# Patient Record
Sex: FEMALE
[De-identification: names, ages, dates, MRNs, and addresses within clinical notes are randomized per-mention and may not be internally consistent; named-entity substitution may affect disease eponyms.]

---

## 2020-08-25 ENCOUNTER — APPOINTMENT (OUTPATIENT)
Dept: ENDOCRINOLOGY | Facility: CLINIC | Age: 30
End: 2020-08-25
Payer: COMMERCIAL

## 2020-08-25 ENCOUNTER — TRANSCRIPTION ENCOUNTER (OUTPATIENT)
Age: 30
End: 2020-08-25

## 2020-08-25 VITALS
WEIGHT: 148 LBS | OXYGEN SATURATION: 99 % | BODY MASS INDEX: 24.36 KG/M2 | DIASTOLIC BLOOD PRESSURE: 70 MMHG | HEIGHT: 65.5 IN | SYSTOLIC BLOOD PRESSURE: 108 MMHG | HEART RATE: 90 BPM

## 2020-08-25 DIAGNOSIS — Z86.39 PERSONAL HISTORY OF OTHER ENDOCRINE, NUTRITIONAL AND METABOLIC DISEASE: ICD-10-CM

## 2020-08-25 DIAGNOSIS — Z78.9 OTHER SPECIFIED HEALTH STATUS: ICD-10-CM

## 2020-08-25 DIAGNOSIS — Z83.3 FAMILY HISTORY OF DIABETES MELLITUS: ICD-10-CM

## 2020-08-25 DIAGNOSIS — Z83.49 FAMILY HISTORY OF OTHER ENDOCRINE, NUTRITIONAL AND METABOLIC DISEASES: ICD-10-CM

## 2020-08-25 PROBLEM — Z00.00 ENCOUNTER FOR PREVENTIVE HEALTH EXAMINATION: Status: ACTIVE | Noted: 2020-08-25

## 2020-08-25 LAB — GLUCOSE BLDC GLUCOMTR-MCNC: 288

## 2020-08-25 PROCEDURE — 99205 OFFICE O/P NEW HI 60 MIN: CPT | Mod: 25

## 2020-08-25 PROCEDURE — 82962 GLUCOSE BLOOD TEST: CPT

## 2020-08-25 NOTE — ASSESSMENT
[Diabetes Foot Care] : diabetes foot care [Long Term Vascular Complications] : long term vascular complications of diabetes [Carbohydrate Consistent Diet] : carbohydrate consistent diet [Importance of Diet and Exercise] : importance of diet and exercise to improve glycemic control, achieve weight loss and improve cardiovascular health [Exercise/Effect on Glucose] : exercise/effect on glucose [Self Monitoring of Blood Glucose] : self monitoring of blood glucose [Retinopathy Screening] : Patient was referred to ophthalmology for retinopathy screening [Diabetic Medications] : Risks and benefits of diabetic medications were discussed

## 2020-08-25 NOTE — HISTORY OF PRESENT ILLNESS
[FreeTextEntry1] : Ms. MARK MILLER is 28 year female .\par D  was diagnosed of diabetes type 2- 5    years back. She was diagnosed in pregnancy \par Till recently she was taking combination of Gliclazide and Metformin- 2 days back \par now only on metformin last two 2 days.  \par suffered miscarriage 5 yrs back \par no insulin use except for pregnancy \par last A1c was 7 % - a year back \par was on glycomet in [place] as well \par was also taking Dr Duong Hanna \par losing weight , , no heart attack , no stroke \par not fasting today - had high carb lunch \par she is tolerating these medications well. \par no recent eye exam \par neuropathic symptoms -none \par weight loss -yes \par  Hypoglycemia frequency is none \par family history of diabetes -father \par mother has thyroid concerns \par high carb diet - [place] dietary preferences \par Educator visit -never \par \par

## 2020-08-25 NOTE — REASON FOR VISIT
[DM Type 2] : DM Type 2 [Consultation] : a consultation visit [Spouse] : spouse [FreeTextEntry2] : PCP

## 2020-08-28 ENCOUNTER — LABORATORY RESULT (OUTPATIENT)
Age: 30
End: 2020-08-28

## 2020-08-31 ENCOUNTER — APPOINTMENT (OUTPATIENT)
Dept: ENDOCRINOLOGY | Facility: CLINIC | Age: 30
End: 2020-08-31
Payer: COMMERCIAL

## 2020-08-31 VITALS
DIASTOLIC BLOOD PRESSURE: 70 MMHG | BODY MASS INDEX: 24.2 KG/M2 | HEART RATE: 80 BPM | OXYGEN SATURATION: 98 % | HEIGHT: 65.5 IN | WEIGHT: 147 LBS | SYSTOLIC BLOOD PRESSURE: 100 MMHG

## 2020-08-31 LAB — GLUCOSE BLDC GLUCOMTR-MCNC: 175

## 2020-08-31 PROCEDURE — 82962 GLUCOSE BLOOD TEST: CPT

## 2020-08-31 PROCEDURE — 99214 OFFICE O/P EST MOD 30 MIN: CPT | Mod: 25

## 2020-08-31 NOTE — HISTORY OF PRESENT ILLNESS
[FreeTextEntry1] : Ms. MARK MILLER is 28 year female .\par D  was diagnosed of diabetes type 2- 5    years back. She was diagnosed in pregnancy \par Till recently she was taking combination of Gliclazide and Metformin- 2 days back \par now only on metformin last two 2 days.  \par suffered miscarriage 5 yrs back \par no insulin use except for pregnancy \par last A1c was 7 % - a year back \par was on glycomet in [place] as well \par was also taking Dr Duong Hanna \par losing weight , , no heart attack , no stroke \par not fasting today - had high carb lunch \par she is tolerating these medications well. \par no recent eye exam \par neuropathic symptoms -none \par weight loss -yes \par  Hypoglycemia frequency is none \par family history of diabetes -father \par mother has thyroid concerns \par high carb diet - [place] dietary preferences \par Educator visit -never \par \par \par 08/31/2020- FOLLOW UP\par labs discussed in detail \par lipid panel pending \par adv to follow low carb diet \par Review of system \par no chest pain, no palpitations \par no Shortness of breath,no wheezing. \par \par \par

## 2020-08-31 NOTE — REASON FOR VISIT
[Consultation] : a consultation visit [DM Type 2] : DM Type 2 [Spouse] : spouse [FreeTextEntry2] : PCP

## 2021-01-08 ENCOUNTER — TRANSCRIPTION ENCOUNTER (OUTPATIENT)
Age: 31
End: 2021-01-08

## 2021-03-05 ENCOUNTER — APPOINTMENT (OUTPATIENT)
Dept: ENDOCRINOLOGY | Facility: CLINIC | Age: 31
End: 2021-03-05
Payer: COMMERCIAL

## 2021-03-05 VITALS
SYSTOLIC BLOOD PRESSURE: 110 MMHG | BODY MASS INDEX: 23.7 KG/M2 | OXYGEN SATURATION: 99 % | DIASTOLIC BLOOD PRESSURE: 60 MMHG | HEIGHT: 65.5 IN | HEART RATE: 90 BPM | WEIGHT: 144 LBS

## 2021-03-05 LAB — GLUCOSE BLDC GLUCOMTR-MCNC: 152

## 2021-03-05 PROCEDURE — 99214 OFFICE O/P EST MOD 30 MIN: CPT | Mod: 25

## 2021-03-05 PROCEDURE — 99072 ADDL SUPL MATRL&STAF TM PHE: CPT

## 2021-03-05 PROCEDURE — 82962 GLUCOSE BLOOD TEST: CPT

## 2021-03-05 NOTE — HISTORY OF PRESENT ILLNESS
[FreeTextEntry1] : Ms. MARK MILLER is 28 year female .\par D  was diagnosed of diabetes type 2- 5    years back. She was diagnosed in pregnancy \par Till recently she was taking combination of Gliclazide and Metformin- 2 days back \par now only on metformin last two 2 days.  \par suffered miscarriage 5 yrs back \par no insulin use except for pregnancy \par last A1c was 7 % - a year back \par was on glycomet in [place] as well \par was also taking Dr Duong Hanna \par losing weight , , no heart attack , no stroke \par not fasting today - had high carb lunch \par she is tolerating these medications well. \par no recent eye exam \par neuropathic symptoms -none \par weight loss -yes \par  Hypoglycemia frequency is none \par family history of diabetes -father \par mother has thyroid concerns \par high carb diet - [place] dietary preferences \par Educator visit -never \par \par \par 08/31/2020- FOLLOW UP\par labs discussed in detail \par lipid panel pending \par adv to follow low carb diet \par Review of system \par no chest pain, no palpitations \par no Shortness of breath,no wheezing. \par \par \par 03/05/2021- FOLLOW UP\par 30-year-old lady returns for follow-up.  She is on Metformin thousand twice daily but has been taking less of doses as she is thinks this is a very high dose.  She is also taking Januvia 100 mg daily.  Of recent over last 2 3 months she has tried intermittent fasting which she follows a 12 on 12 of pattern or she would do a 14-hour fasting versus 6-hour eating.\par

## 2021-03-05 NOTE — ASSESSMENT
[Carbohydrate Consistent Diet] : carbohydrate consistent diet [Importance of Diet and Exercise] : importance of diet and exercise to improve glycemic control, achieve weight loss and improve cardiovascular health [Hypoglycemia Management] : hypoglycemia management [Self Monitoring of Blood Glucose] : self monitoring of blood glucose [Retinopathy Screening] : Patient was referred to ophthalmology for retinopathy screening [Diabetic Medications] : Risks and benefits of diabetic medications were discussed

## 2021-03-19 ENCOUNTER — APPOINTMENT (OUTPATIENT)
Dept: ENDOCRINOLOGY | Facility: CLINIC | Age: 31
End: 2021-03-19

## 2021-11-22 RX ORDER — METFORMIN HYDROCHLORIDE 500 MG/1
500 TABLET, COATED ORAL
Qty: 360 | Refills: 0 | Status: ACTIVE | COMMUNITY
Start: 1900-01-01 | End: 1900-01-01

## 2022-01-10 ENCOUNTER — APPOINTMENT (OUTPATIENT)
Dept: ENDOCRINOLOGY | Facility: CLINIC | Age: 32
End: 2022-01-10
Payer: COMMERCIAL

## 2022-01-10 ENCOUNTER — RESULT CHARGE (OUTPATIENT)
Age: 32
End: 2022-01-10

## 2022-01-10 VITALS
SYSTOLIC BLOOD PRESSURE: 110 MMHG | HEIGHT: 65 IN | WEIGHT: 141.2 LBS | HEART RATE: 86 BPM | DIASTOLIC BLOOD PRESSURE: 60 MMHG | OXYGEN SATURATION: 99 % | BODY MASS INDEX: 23.53 KG/M2

## 2022-01-10 LAB — GLUCOSE BLDC GLUCOMTR-MCNC: 156

## 2022-01-10 PROCEDURE — 99214 OFFICE O/P EST MOD 30 MIN: CPT

## 2022-01-10 RX ORDER — INSULIN GLARGINE 100 [IU]/ML
100 INJECTION, SOLUTION SUBCUTANEOUS DAILY
Qty: 5 | Refills: 0 | Status: ACTIVE | COMMUNITY
Start: 2022-01-10 | End: 1900-01-01

## 2022-01-10 RX ORDER — SITAGLIPTIN 100 MG/1
100 TABLET, FILM COATED ORAL
Qty: 90 | Refills: 0 | Status: DISCONTINUED | COMMUNITY
Start: 2020-08-31 | End: 2022-01-10

## 2022-01-10 RX ORDER — PEN NEEDLE, DIABETIC 29 G X1/2"
29G X 12.7MM NEEDLE, DISPOSABLE MISCELLANEOUS
Qty: 2 | Refills: 2 | Status: ACTIVE | COMMUNITY
Start: 2022-01-10 | End: 1900-01-01

## 2022-01-10 NOTE — HISTORY OF PRESENT ILLNESS
[FreeTextEntry1] : Ms. MARK MILLER is 28 year female .\par MARK  was diagnosed of diabetes type 2- 5    years back. She was diagnosed in pregnancy \par Till recently she was taking combination of Gliclazide and Metformin- 2 days back \par now only on metformin last two 2 days.  \par suffered miscarriage 5 yrs back \par no insulin use except for pregnancy \par last A1c was 7 % - a year back \par was on glycomet in [place] as well \par was also taking Dr Duong Hanna \par losing weight , , no heart attack , no stroke \par not fasting today - had high carb lunch \par she is tolerating these medications well. \par no recent eye exam \par neuropathic symptoms -none \par weight loss -yes \par  Hypoglycemia frequency is none \par family history of diabetes -father \par mother has thyroid concerns \par high carb diet -  dietary preferences \par Educator visit -never \par \par \par 08/31/2020- FOLLOW UP\par labs discussed in detail \par lipid panel pending \par adv to follow low carb diet \par Review of system \par no chest pain, no palpitations \par no Shortness of breath,no wheezing. \par \par \par 03/05/2021- FOLLOW UP\par 30-year-old lady returns for follow-up.  She is on Metformin thousand twice daily but has been taking less of doses as she is thinks this is a very high dose.  She is also taking Januvia 100 mg daily.  Of recent over last 2 3 months she has tried intermittent fasting which she follows a 12 on 12 of pattern or she would do a 14-hour fasting versus 6-hour eating.\par \par \par 01/10/2022- FOLLOW UP\par 31-year-old lady returns for follow-up she has plans to conceive now wants to make sure she has good glycemic control for the same and Januvia 100 mg daily and metformin thousand twice daily.  She feels well otherwise due for an eye exam.  Today I have advised her to stop Januvia and start Lantus 10 units and hypoglycemia precautions were revisited with the patient.  Continue metformin.  Replacement was performed.  Referral to OB/GYN was given.  Additionally she was urged to get an eye exam and establish with a new ophthalmologist.  I will review her labs and CGM download in 2 to 3 weeks from now.

## 2022-01-18 LAB
ALBUMIN SERPL ELPH-MCNC: 4.6 G/DL
ALBUMIN SERPL ELPH-MCNC: 4.6 G/DL
ALP BLD-CCNC: 58 U/L
ALT SERPL-CCNC: 8 U/L
ANION GAP SERPL CALC-SCNC: 14 MMOL/L
AST SERPL-CCNC: 8 U/L
BILIRUB DIRECT SERPL-MCNC: 0.1 MG/DL
BILIRUB INDIRECT SERPL-MCNC: 0.2 MG/DL
BILIRUB SERPL-MCNC: 0.2 MG/DL
BUN SERPL-MCNC: 8 MG/DL
C PEPTIDE SERPL-MCNC: 3 NG/ML
CALCIUM SERPL-MCNC: 9.2 MG/DL
CHLORIDE SERPL-SCNC: 104 MMOL/L
CHOLEST SERPL-MCNC: 183 MG/DL
CO2 SERPL-SCNC: 20 MMOL/L
CREAT SERPL-MCNC: 0.55 MG/DL
CREAT SPEC-SCNC: 38 MG/DL
ESTIMATED AVERAGE GLUCOSE: 154 MG/DL
GLUCOSE SERPL-MCNC: 150 MG/DL
HBA1C MFR BLD HPLC: 7 %
HDLC SERPL-MCNC: 32 MG/DL
LDLC SERPL CALC-MCNC: 114 MG/DL
MICROALBUMIN 24H UR DL<=1MG/L-MCNC: <1.2 MG/DL
MICROALBUMIN/CREAT 24H UR-RTO: NORMAL MG/G
NONHDLC SERPL-MCNC: 151 MG/DL
PHOSPHATE SERPL-MCNC: 3.1 MG/DL
POTASSIUM SERPL-SCNC: 4.8 MMOL/L
PROT SERPL-MCNC: 7.4 G/DL
SODIUM SERPL-SCNC: 138 MMOL/L
TRIGL SERPL-MCNC: 182 MG/DL
TSH SERPL-ACNC: 2.65 UIU/ML

## 2022-01-26 ENCOUNTER — APPOINTMENT (OUTPATIENT)
Dept: ENDOCRINOLOGY | Facility: CLINIC | Age: 32
End: 2022-01-26
Payer: COMMERCIAL

## 2022-01-26 VITALS
HEIGHT: 65 IN | DIASTOLIC BLOOD PRESSURE: 60 MMHG | HEART RATE: 87 BPM | WEIGHT: 138 LBS | OXYGEN SATURATION: 99 % | BODY MASS INDEX: 22.99 KG/M2 | SYSTOLIC BLOOD PRESSURE: 100 MMHG

## 2022-01-26 LAB — GLUCOSE BLDC GLUCOMTR-MCNC: 184

## 2022-01-26 PROCEDURE — 95251 CONT GLUC MNTR ANALYSIS I&R: CPT

## 2022-01-26 PROCEDURE — 99214 OFFICE O/P EST MOD 30 MIN: CPT | Mod: 25

## 2022-01-26 PROCEDURE — 95250 CONT GLUC MNTR PHYS/QHP EQP: CPT

## 2022-04-28 ENCOUNTER — APPOINTMENT (OUTPATIENT)
Dept: ENDOCRINOLOGY | Facility: CLINIC | Age: 32
End: 2022-04-28
Payer: COMMERCIAL

## 2022-04-28 VITALS
SYSTOLIC BLOOD PRESSURE: 98 MMHG | HEART RATE: 75 BPM | HEIGHT: 65 IN | BODY MASS INDEX: 23.66 KG/M2 | WEIGHT: 142 LBS | DIASTOLIC BLOOD PRESSURE: 72 MMHG | OXYGEN SATURATION: 99 %

## 2022-04-28 DIAGNOSIS — Z31.69 ENCOUNTER FOR OTHER GENERAL COUNSELING AND ADVICE ON PROCREATION: ICD-10-CM

## 2022-04-28 DIAGNOSIS — E11.9 TYPE 2 DIABETES MELLITUS W/OUT COMPLICATIONS: ICD-10-CM

## 2022-04-28 PROCEDURE — 82962 GLUCOSE BLOOD TEST: CPT

## 2022-04-28 PROCEDURE — 99214 OFFICE O/P EST MOD 30 MIN: CPT

## 2022-04-28 NOTE — HISTORY OF PRESENT ILLNESS
[FreeTextEntry1] : Ms. MARK MILLER is 28 year female .\par MARK  was diagnosed of diabetes type 2- 5    years back. She was diagnosed in pregnancy \par Till recently she was taking combination of Gliclazide and Metformin- 2 days back \par now only on metformin last two 2 days.  \par suffered miscarriage 5 yrs back \par no insulin use except for pregnancy \par last A1c was 7 % - a year back \par was on glycomet in [place] as well \par was also taking Dr Duong Hanna \par losing weight , , no heart attack , no stroke \par not fasting today - had high carb lunch \par she is tolerating these medications well. \par no recent eye exam \par neuropathic symptoms -none \par weight loss -yes \par  Hypoglycemia frequency is none \par family history of diabetes -father \par mother has thyroid concerns \par high carb diet -  dietary preferences \par Educator visit -never \par \par \par 08/31/2020- FOLLOW UP\par labs discussed in detail \par lipid panel pending \par adv to follow low carb diet \par Review of system \par no chest pain, no palpitations \par no Shortness of breath,no wheezing. \par \par \par 03/05/2021- FOLLOW UP\par 30-year-old lady returns for follow-up.  She is on Metformin thousand twice daily but has been taking less of doses as she is thinks this is a very high dose.  She is also taking Januvia 100 mg daily.  Of recent over last 2 3 months she has tried intermittent fasting which she follows a 12 on 12 of pattern or she would do a 14-hour fasting versus 6-hour eating.\par \par \par 01/10/2022- FOLLOW UP\par 31-year-old lady returns for follow-up she has plans to conceive now wants to make sure she has good glycemic control for the same and Januvia 100 mg daily and metformin thousand twice daily.  She feels well otherwise due for an eye exam.  Today I have advised her to stop Januvia and start Lantus 10 units and hypoglycemia precautions were revisited with the patient.  Continue metformin.  Replacement was performed.  Referral to OB/GYN was given.  Additionally she was urged to get an eye exam and establish with a new ophthalmologist.  I will review her labs and CGM download in 2 to 3 weeks from now.\par \par \par 01/26/2022- FOLLOW UP\par Currently on Lantus 10 units in the evening Metformin thousand twice daily.  Tolerating medications fine she yet has to establish with an OB/GYN and ophthalmologist.  Her lipid report was reviewed over last 15 days time in range 92% high 8% with no very highs or lows.  She is taking Lantus without any concerns and would like to continue the same GMI is of around 6.5%.  Remains he is on 7% I think she is optimized for conception as far as the glycemic control is concerned but I would like to get clearance from OB/GYN as well.  She is also advised to see ophthalmology.  Additionally have advised her to start a prenatal vitamin which at least has 400 mcg of folic acid and 150 mcg of potassium iodide daily.  All questions were answered I would like to see her back in 6 weeks for review\par \par \par 04/28/2022- FOLLOW UP\par doing well \par on Lantus 10 units and metformin thousand twice daily.  Blood sugar range 130 to 140 mg percent in the mornings and range 150 to 160 mg percent in the evenings.  That is after meals.  Still did not start a prenatal vitamin.  Eye exam was reassuring per ophthalmology still has to see an OB/GYN.  I will check labs today.  Will uptitrate insulin if needed.  Decide on enrico placement after I get the labs.  Follow-up in 3 months.

## 2022-04-29 LAB
ESTIMATED AVERAGE GLUCOSE: 174 MG/DL
GLUCOSE BLDC GLUCOMTR-MCNC: 116
HBA1C MFR BLD HPLC: 7.7 %
T4 FREE SERPL-MCNC: 1.3 NG/DL
TSH SERPL-ACNC: 2.32 UIU/ML

## 2022-05-06 ENCOUNTER — APPOINTMENT (OUTPATIENT)
Dept: ENDOCRINOLOGY | Facility: CLINIC | Age: 32
End: 2022-05-06

## 2022-05-10 LAB
THYROGLOB AB SERPL-ACNC: <20 IU/ML
THYROPEROXIDASE AB SERPL IA-ACNC: <10 IU/ML

## 2022-05-20 ENCOUNTER — APPOINTMENT (OUTPATIENT)
Dept: ENDOCRINOLOGY | Facility: CLINIC | Age: 32
End: 2022-05-20

## 2022-07-28 ENCOUNTER — APPOINTMENT (OUTPATIENT)
Dept: ENDOCRINOLOGY | Facility: CLINIC | Age: 32
End: 2022-07-28